# Patient Record
Sex: FEMALE | Race: OTHER | URBAN - METROPOLITAN AREA
[De-identification: names, ages, dates, MRNs, and addresses within clinical notes are randomized per-mention and may not be internally consistent; named-entity substitution may affect disease eponyms.]

---

## 2018-01-03 ENCOUNTER — EMERGENCY (EMERGENCY)
Facility: HOSPITAL | Age: 33
LOS: 1 days | Discharge: HOME | End: 2018-01-03
Attending: EMERGENCY MEDICINE | Admitting: EMERGENCY MEDICINE
Payer: COMMERCIAL

## 2018-01-03 VITALS
HEART RATE: 76 BPM | DIASTOLIC BLOOD PRESSURE: 83 MMHG | OXYGEN SATURATION: 100 % | TEMPERATURE: 98 F | SYSTOLIC BLOOD PRESSURE: 118 MMHG | RESPIRATION RATE: 18 BRPM | HEIGHT: 66 IN | WEIGHT: 270.07 LBS

## 2018-01-03 DIAGNOSIS — N93.9 ABNORMAL UTERINE AND VAGINAL BLEEDING, UNSPECIFIED: ICD-10-CM

## 2018-01-03 PROCEDURE — 99284 EMERGENCY DEPT VISIT MOD MDM: CPT | Mod: 25

## 2018-01-03 PROCEDURE — 85025 COMPLETE CBC W/AUTO DIFF WBC: CPT

## 2018-01-03 PROCEDURE — 85730 THROMBOPLASTIN TIME PARTIAL: CPT

## 2018-01-03 PROCEDURE — 76830 TRANSVAGINAL US NON-OB: CPT | Mod: 26

## 2018-01-03 PROCEDURE — 86901 BLOOD TYPING SEROLOGIC RH(D): CPT

## 2018-01-03 PROCEDURE — 99285 EMERGENCY DEPT VISIT HI MDM: CPT

## 2018-01-03 PROCEDURE — 85610 PROTHROMBIN TIME: CPT

## 2018-01-03 PROCEDURE — 86900 BLOOD TYPING SEROLOGIC ABO: CPT

## 2018-01-03 PROCEDURE — 81001 URINALYSIS AUTO W/SCOPE: CPT

## 2018-01-03 PROCEDURE — 76830 TRANSVAGINAL US NON-OB: CPT

## 2018-01-03 PROCEDURE — 84702 CHORIONIC GONADOTROPIN TEST: CPT

## 2018-01-03 PROCEDURE — 86850 RBC ANTIBODY SCREEN: CPT

## 2018-01-03 PROCEDURE — 36415 COLL VENOUS BLD VENIPUNCTURE: CPT

## 2018-01-03 PROCEDURE — 80053 COMPREHEN METABOLIC PANEL: CPT

## 2018-01-03 NOTE — ED PROVIDER NOTE - OBJECTIVE STATEMENT
32F with no known medical history, however report of irregular periods presenting to the ED complaining of heavy vaginal bleeding. Patient states that she has very irregular periods with cycles ranging from 2 weeks to 2 months. Has not been seen by Ob/Gyn in at least 3 years, has never been on any 32F with no known medical history, however report of irregular periods presenting to the ED complaining of heavy vaginal bleeding. Patient states that she has very irregular periods with cycles ranging from 2 weeks to 2 months. Has not been seen by Ob/Gyn in at least 3 years, has never taken any OCPs 32F with no known medical history, however report of irregular periods presenting to the ED complaining of heavy vaginal bleeding. Patient states that she has very irregular periods with cycles ranging from 2 weeks to 2 months. Has not been seen by Ob/Gyn in at least 3 years, has never taken any OCPs. Never with heavy period in the past, never requiring workup. Bleeding has been persistently heavy over the last 3 days and has noted multiple clots. Has been going through about 2pads (used at the same time) over 2 hours. No history of anemia or fibroids in the past. No HA 32F with no known medical history, however report of irregular periods presenting to the ED complaining of heavy vaginal bleeding. Patient states that she has very irregular periods with cycles ranging from 2 weeks to 2 months. Has not been seen by Ob/Gyn in at least 3 years, has never taken any OCPs. Never with heavy period in the past, never requiring workup. Bleeding has been persistently heavy over the last 3 days and has noted multiple clots. Has been going through about 2pads (used at the same time) over 2 hours. No history of anemia or fibroids in the past. No HA, no dizziness at this time, no SOB, no abdominal pain, reports some back cramps that are similar to her normal cramps.

## 2018-01-03 NOTE — ED ADULT TRIAGE NOTE - ARRIVAL INFO ADDITIONAL COMMENTS
Pt c/o heavy vaginal bleeding with clots x 3days. Pt also c/o worse than her usual menstrual cramps. Pt also c/o dizziness and generalized weakness today.

## 2018-01-03 NOTE — ED PROVIDER NOTE - MEDICAL DECISION MAKING DETAILS
patient with ++vaginal bleeding compared to usual. CBC without need for urgent transfusion. US transvaginal performed patient with ++vaginal bleeding compared to usual. CBC without need for urgent transfusion. US transvaginal performed no acute findings will dc to home with info on gyn follow up

## 2018-01-03 NOTE — ED PROVIDER NOTE - ATTENDING CONTRIBUTION TO CARE
Patient in ED with cc increased amount of vaginal bleeding and pelvic discomfort over the past several days.  Patient notes irregularity to her menstrual cycle, however states she is bleeding more than what it typical for her.  She denies associated fever, chills, urinary symptoms, changes to bowel movements, nausea, vomiting or any additional acute complaints/concerns at this time.  Upon my face to face ED evaluation of patient she is non toxic in appearance, heart with regular and rhythm, lungs are clear to auscultation, bilaterally.  Abd is soft without significant reproducibility of pain on exam.  US imaging completed and unremarkable.  Hgb low end of normal.  Results discussed with patient.  Gyn in ED to evaluate.  Will disposition per recommendations, however anticipate discharge with outpatient follow up.  Patient aware of plan and is currently resting comfortably. Patient in ED with cc increased amount of vaginal bleeding and pelvic discomfort over the past several days.  Patient notes irregularity to her menstrual cycle, however states she is bleeding more than what it typical for her.  She denies associated fever, chills, urinary symptoms, changes to bowel movements, nausea, vomiting or any additional acute complaints/concerns at this time.  Upon my face to face ED evaluation of patient she is non toxic in appearance, heart with regular and rhythm, lungs are clear to auscultation, bilaterally.  Abd is soft without significant reproducibility of pain on exam.  US imaging completed and unremarkable.  Hgb low end of normal.  Results discussed with patient.  Patient to be discharged at this time as she is non toxic, vitals stable and US unremarkable.  She is provided gyn follow up info and also advised to follow up with her PCP in 1-2 days for re eval.  She is told to return immediately to ED should symptoms worsen or if she has any concern prior to this recommended follow up.  Patient aware of plan and verbalizes her understanding.  She will be discharged home at this time.

## 2018-01-04 VITALS
TEMPERATURE: 98 F | SYSTOLIC BLOOD PRESSURE: 121 MMHG | DIASTOLIC BLOOD PRESSURE: 78 MMHG | RESPIRATION RATE: 18 BRPM | OXYGEN SATURATION: 100 % | HEART RATE: 77 BPM

## 2018-12-17 ENCOUNTER — EMERGENCY (EMERGENCY)
Facility: HOSPITAL | Age: 33
LOS: 1 days | Discharge: HOME | End: 2018-12-17
Attending: EMERGENCY MEDICINE
Payer: COMMERCIAL

## 2018-12-17 VITALS
OXYGEN SATURATION: 98 % | DIASTOLIC BLOOD PRESSURE: 84 MMHG | HEART RATE: 86 BPM | WEIGHT: 274.03 LBS | SYSTOLIC BLOOD PRESSURE: 130 MMHG | HEIGHT: 65 IN | RESPIRATION RATE: 16 BRPM | TEMPERATURE: 97 F

## 2018-12-17 PROCEDURE — 96375 TX/PRO/DX INJ NEW DRUG ADDON: CPT

## 2018-12-17 PROCEDURE — 96374 THER/PROPH/DIAG INJ IV PUSH: CPT

## 2018-12-17 PROCEDURE — 99284 EMERGENCY DEPT VISIT MOD MDM: CPT | Mod: 25

## 2018-12-17 RX ORDER — DIPHENHYDRAMINE HCL 50 MG
25 CAPSULE ORAL ONCE
Qty: 0 | Refills: 0 | Status: COMPLETED | OUTPATIENT
Start: 2018-12-17 | End: 2018-12-17

## 2018-12-17 RX ORDER — PSEUDOEPHEDRINE HCL 30 MG
1 TABLET ORAL
Qty: 15 | Refills: 0 | OUTPATIENT
Start: 2018-12-17 | End: 2018-12-21

## 2018-12-17 RX ORDER — KETOROLAC TROMETHAMINE 30 MG/ML
30 SYRINGE (ML) INJECTION ONCE
Qty: 0 | Refills: 0 | Status: DISCONTINUED | OUTPATIENT
Start: 2018-12-17 | End: 2018-12-17

## 2018-12-17 RX ORDER — METOCLOPRAMIDE HCL 10 MG
10 TABLET ORAL ONCE
Qty: 0 | Refills: 0 | Status: COMPLETED | OUTPATIENT
Start: 2018-12-17 | End: 2018-12-17

## 2018-12-17 RX ORDER — SODIUM CHLORIDE 9 MG/ML
1000 INJECTION INTRAMUSCULAR; INTRAVENOUS; SUBCUTANEOUS ONCE
Qty: 0 | Refills: 0 | Status: COMPLETED | OUTPATIENT
Start: 2018-12-17 | End: 2018-12-17

## 2018-12-17 RX ORDER — IBUPROFEN 200 MG
1 TABLET ORAL
Qty: 30 | Refills: 0 | OUTPATIENT
Start: 2018-12-17 | End: 2018-12-26

## 2018-12-17 RX ADMIN — Medication 10 MILLIGRAM(S): at 02:53

## 2018-12-17 RX ADMIN — Medication 25 MILLIGRAM(S): at 02:53

## 2018-12-17 RX ADMIN — Medication 30 MILLIGRAM(S): at 02:53

## 2018-12-17 RX ADMIN — SODIUM CHLORIDE 1000 MILLILITER(S): 9 INJECTION INTRAMUSCULAR; INTRAVENOUS; SUBCUTANEOUS at 02:53

## 2018-12-17 NOTE — ED PROVIDER NOTE - NEUROLOGICAL, MLM
Alert and oriented, no focal deficits, no motor or sensory deficits. Neck supple, full range of motion.

## 2018-12-17 NOTE — ED ADULT NURSE NOTE - NSIMPLEMENTINTERV_GEN_ALL_ED
Implemented All Universal Safety Interventions:  [place] to call system. Call bell, personal items and telephone within reach. Instruct patient to call for assistance. Room bathroom lighting operational. Non-slip footwear when patient is off stretcher. Physically safe environment: no spills, clutter or unnecessary equipment. Stretcher in lowest position, wheels locked, appropriate side rails in place.

## 2018-12-17 NOTE — ED PROVIDER NOTE - MEDICAL DECISION MAKING DETAILS
34 y/o F here w/ likely viral URI, w/ sinus congestion/sinusitis. Low suspicion for bacterial sinusitis. Will treat symptomatically and re-evaluate.

## 2018-12-17 NOTE — ED PROVIDER NOTE - OBJECTIVE STATEMENT
32 y/o F with no significant PMHx presents to ED c/o headache and body aches x 2-3 days ago w/ URI like symptoms of congestion and dry cough. Last night pt developed a severe headache over L frontal sinus. Pt denies any shortness of breath, chest pain, back pain, neck pain/stiffness, photophobia, any neurological symptoms or any other complaints.

## 2018-12-17 NOTE — ED PROVIDER NOTE - NSFOLLOWUPINSTRUCTIONS_ED_ALL_ED_FT
Take meds as prescribed.  Drink plenty of fluids.   Follow up with your doctor or in the Clinic as discussed within 2 days.  Return to the ER for any concerns.

## 2018-12-17 NOTE — ED ADULT NURSE NOTE - OBJECTIVE STATEMENT
pt is A&Ox3, ambulatory, able to make needs known and presents with C/O headache, sore throat, chest pain, cough and sinus pressure that is worsening in nature x 4 days

## 2018-12-17 NOTE — ED PROVIDER NOTE - NSFOLLOWUPCLINICS_GEN_ALL_ED_FT
[facility]  [facility]  [address] - [address] [place] [address]  Phone: [phone]  Fax: [phone]  Follow Up Time:

## 2019-08-27 ENCOUNTER — APPOINTMENT (OUTPATIENT)
Dept: RHEUMATOLOGY | Facility: CLINIC | Age: 34
End: 2019-08-27
Payer: COMMERCIAL

## 2019-08-27 VITALS
BODY MASS INDEX: 45.15 KG/M2 | OXYGEN SATURATION: 100 % | HEART RATE: 82 BPM | SYSTOLIC BLOOD PRESSURE: 105 MMHG | WEIGHT: 271 LBS | HEIGHT: 65 IN | DIASTOLIC BLOOD PRESSURE: 73 MMHG | TEMPERATURE: 97.9 F

## 2019-08-27 PROBLEM — Z00.00 ENCOUNTER FOR PREVENTIVE HEALTH EXAMINATION: Status: ACTIVE | Noted: 2019-08-27

## 2019-08-27 PROCEDURE — 99205 OFFICE O/P NEW HI 60 MIN: CPT | Mod: GC,25

## 2019-08-27 PROCEDURE — 36415 COLL VENOUS BLD VENIPUNCTURE: CPT

## 2019-08-28 NOTE — PHYSICAL EXAM
[General Appearance - Alert] : alert [General Appearance - In No Acute Distress] : in no acute distress [Extraocular Movements] : extraocular movements were intact [Sclera] : the sclera and conjunctiva were normal [PERRL With Normal Accommodation] : pupils were equal in size, round, and reactive to light [Outer Ear] : the ears and nose were normal in appearance [Oropharynx] : the oropharynx was normal [] : no respiratory distress [Heart Rate And Rhythm] : heart rate was normal and rhythm regular [Auscultation Breath Sounds / Voice Sounds] : lungs were clear to auscultation bilaterally [Heart Sounds] : normal S1 and S2 [Heart Sounds Gallop] : no gallops [Murmurs] : no murmurs [Heart Sounds Pericardial Friction Rub] : no pericardial rub [FreeTextEntry1] : decreased range of motion on R ankle, normal ROM on L ankle, no erythema or warmth, no edema

## 2019-08-28 NOTE — HISTORY OF PRESENT ILLNESS
[Depression] : depression [Fatigue] : fatigue [Arthralgias] : arthralgias [Joint Swelling] : joint swelling [Decreased ROM] : decreased range of motion [Difficulty Standing] : difficulty standing [Difficulty Walking] : difficulty walking [Muscle Weakness] : muscle weakness [Muscle Spasms] : muscle spasms [FreeTextEntry1] : 33 y.o F who presents as referral from Dr. Flor for R ankle pain and positive SRINIVAS. \par \par Pt reports that 4 months ago, she started having a lot of pain in her R ankle after standing on it 2 hours. No other joints are involved. R foot toes get stiff and cannot move. Endorses swelling when it flares. Denies morning stiffness. Only starts to feel pain after standing/walking on it for 2 hours; only sometimes relieves with sitting down/rest. Pain is constant when it starts, pain alleviates with heat pack. Causing her anxiety, hinders her to go out and run errands for a long time. \par \par Presented to urgent care 3 months ago who said xray was normal and referred her to podiatry. Ibuprofen 600mg didn't resolve the pain. Received cortisone injection twice by podiatrist 1.5 months ago most recently, without any relief. Podiatrist prescribed Meloxicam, which made her dizzy so she stopped taking. Pt was originally referred to hematologist because "bone discoloration" was seen on R ankle MRI. She thinks that hematologist may have suggested lupus and arthritis. Hematologist prescribed Naproxen, doesn't notice significant difference. Tried oral steroids without any relief, made her sick.\par \par Started physical therapy yesterday, but says that it was limited because no known diagnosis. \par \par No RP, no hx of miscarriages, no hx of proteinuria/hematuria, no hx of pleuropericarditis, no hx of arterial/venous clot, no alopecia.\par  [Anorexia] : no anorexia [Weight Loss] : no weight loss [Malaise] : no malaise [Fever] : no fever [Chills] : no chills [Malar Facial Rash] : no malar facial rash [Skin Lesions] : no lesions [Skin Nodules] : no skin nodules [Oral Ulcers] : no oral ulcers [Cough] : no cough [Dry Mouth] : no dry mouth [Dysphonia] : no dysphonia [Dysphagia] : no dysphagia [Shortness of Breath] : no shortness of breath [Chest Pain] : no chest pain [Joint Warmth] : no joint warmth [Joint Deformity] : no joint deformity [Morning Stiffness] : no morning stiffness [Falls] : no falls [Dyspnea] : no dyspnea [Myalgias] : no myalgias [Muscle Cramping] : no muscle cramping [Visual Changes] : no visual changes [Eye Pain] : no eye pain [Eye Redness] : no eye redness [Dry Eyes] : no dry eyes

## 2019-08-28 NOTE — ADDENDUM
[FreeTextEntry1] : Patient seen and examined with Dr. Stewart \par Agree with history, physical exam, assessment and plan as described above\par 33 year old female referred for positive SRINIVAS (unclear titre and pattern) drawn in setting of R ankle pain. She does not provide records of serologies or MRI of her ankle. She notes persistent pain for many months, without response to oral or intra-articular steroids - This makes an inflammatory etiology less likely, and the etiology remains unclear. Would like to elucidate what these "dark spots" are that she is referring to. Could be concerning for brown tumor vs osteosarcoma/malignancy, though she endorses no other manifestations of hyperparathyroidism or systemic / constitutional symptoms. \par Check serologies and await records. \par

## 2019-08-28 NOTE — ASSESSMENT
[FreeTextEntry1] : 33 y.o F who presents as referral for positive SRINIVAS and R ankle pain. Unclear etiology of ankle pain without imaging results; patient to send the office report of MRI. Has not achieved any relief with anti-inflammatory treatments such as cortisone injections or oral steroids, so difficult to say that this can be attributed to inflammatory process. However, will evaluate further serologies and bloodwork for positive SRINIVAS. Without MRI report, unclear what "discoloration of bone" is, but consider brown tumor--will check PTH. Differential includes overuse tendinopathy/tendinitis given history of getting worse with use and relieves with rest. \par - bloodwork and serologies for positive SRINIVAS: dsDNA, smith, centromere, cardiolipin, beta2 glycoprotein, scleroderma Abs, anti-thyroid, immunoglobulins, urinalysis, P/C ratio, c3/c4\par - patient to send over MRI results of R ankle to further understand process \par - check PTH for evaluation of discoloration of bone, per pt--consider brown tumor.

## 2019-09-11 LAB
APPEARANCE: ABNORMAL
APTT 2H P 1:4 NP PPP: 58.2 SEC
APTT 2H P INC PPP: 54.7 SEC
APTT 50/50 MIX COMMENT: NORMAL
APTT IMM NP/PRE NP PPP: 55.7 SEC
APTT INV RATIO PPP: 66.3 SEC
B2 GLYCOPROT1 IGA SERPL IA-ACNC: 19.3 SAU
B2 GLYCOPROT1 IGG SER-ACNC: 126.3 SGU
B2 GLYCOPROT1 IGM SER-ACNC: 28.6 SMU
BACTERIA: ABNORMAL
BILIRUBIN URINE: NEGATIVE
BLOOD URINE: NEGATIVE
C3 SERPL-MCNC: 111 MG/DL
C4 SERPL-MCNC: 17 MG/DL
CALCIUM SERPL-MCNC: 8.9 MG/DL
CARDIOLIPIN IGM SER-MCNC: 35.7 MPL
CARDIOLIPIN IGM SER-MCNC: 81 GPL
CENTROMERE IGG SER-ACNC: <0.2 CD:130001892
COLOR: YELLOW
CONFIRM: 27.3 SEC
CREAT SPEC-SCNC: 130 MG/DL
CREAT/PROT UR: 0.1 RATIO
DEPRECATED CARDIOLIPIN IGA SER: 12.2 APL
DRVVT IMM 1:2 NP PPP: ABNORMAL
DRVVT SCREEN TO CONFIRM RATIO: 2.11 RATIO
DSDNA AB SER-ACNC: 135 IU/ML
ENA RNP AB SER IA-ACNC: 0.6 AL
ENA SCL70 IGG SER IA-ACNC: <0.2 AL
ENA SM AB SER IA-ACNC: 0.2 AL
ENA SS-A AB SER IA-ACNC: >8 AL
ENA SS-B AB SER IA-ACNC: >8 AL
FERRITIN SERPL-MCNC: 66 NG/ML
GLUCOSE QUALITATIVE U: NEGATIVE
HYALINE CASTS: 1 /LPF
IGA SER QL IEP: 273 MG/DL
IGG SER QL IEP: 2269 MG/DL
IGM SER QL IEP: 258 MG/DL
KETONES URINE: NEGATIVE
LEUKOCYTE ESTERASE URINE: NEGATIVE
MAGNESIUM SERPL-MCNC: 1.9 MG/DL
MICROSCOPIC-UA: NORMAL
NITRITE URINE: POSITIVE
NPP NORMAL POOLED PLASMA: 34 SECS
PARATHYROID HORMONE INTACT: 23 PG/ML
PH URINE: 6.5
PROT UR-MCNC: 12 MG/DL
PROTEIN URINE: NORMAL
PS IGA SER QL: <20 U/ML
PS IGG SER QL: 75 U/ML
PS IGM SER QL: >100 U/ML
RED BLOOD CELLS URINE: 2 /HPF
RHEUMATOID FACT SER QL: 34 IU/ML
SCREEN DRVVT: 69.4 SEC
SILICA CLOTTING TIME INTERPRETATION: ABNORMAL
SILICA CLOTTING TIME S/C: 3.39 RATIO
SPECIFIC GRAVITY URINE: 1.02
SQUAMOUS EPITHELIAL CELLS: 4 /HPF
THYROGLOB AB SERPL-ACNC: <20 IU/ML
THYROPEROXIDASE AB SERPL IA-ACNC: 11.8 IU/ML
TRANSFERRIN SERPL-MCNC: 253 MG/DL
TSH SERPL-ACNC: 1.42 UIU/ML
UROBILINOGEN URINE: NORMAL
WHITE BLOOD CELLS URINE: 7 /HPF

## 2019-09-30 ENCOUNTER — APPOINTMENT (OUTPATIENT)
Dept: RHEUMATOLOGY | Facility: CLINIC | Age: 34
End: 2019-09-30
Payer: COMMERCIAL

## 2019-09-30 ENCOUNTER — LABORATORY RESULT (OUTPATIENT)
Age: 34
End: 2019-09-30

## 2019-09-30 VITALS
BODY MASS INDEX: 45.32 KG/M2 | HEART RATE: 86 BPM | DIASTOLIC BLOOD PRESSURE: 82 MMHG | TEMPERATURE: 98.7 F | SYSTOLIC BLOOD PRESSURE: 116 MMHG | HEIGHT: 65 IN | OXYGEN SATURATION: 99 % | WEIGHT: 272 LBS

## 2019-09-30 PROCEDURE — 36415 COLL VENOUS BLD VENIPUNCTURE: CPT

## 2019-09-30 PROCEDURE — 99214 OFFICE O/P EST MOD 30 MIN: CPT | Mod: 25

## 2019-09-30 RX ORDER — FOLIC ACID 1 MG/1
1 TABLET ORAL
Qty: 30 | Refills: 6 | Status: ACTIVE | COMMUNITY
Start: 2019-09-30 | End: 1900-01-01

## 2019-10-02 NOTE — HISTORY OF PRESENT ILLNESS
[Fatigue] : fatigue [Depression] : depression [Arthralgias] : arthralgias [Joint Swelling] : joint swelling [Decreased ROM] : decreased range of motion [Difficulty Standing] : difficulty standing [Difficulty Walking] : difficulty walking [Muscle Spasms] : muscle spasms [Muscle Weakness] : muscle weakness [FreeTextEntry1] : Initial Visit: \par 33 year old female referred for positive SRINIVAS (unclear titre and pattern) drawn in setting of R ankle pain. She does not provide records of serologies or MRI of her ankle. She notes persistent pain for many months, without response to oral or intra-articular steroids - This makes an inflammatory etiology less likely, and the etiology remains unclear. Would like to elucidate what these "dark spots" are that she is referring to. Could be concerning for brown tumor vs osteosarcoma/malignancy, though she endorses no other manifestations of hyperparathyroidism or systemic / constitutional symptoms. \par Check serologies and await records. \par  [Anorexia] : no anorexia [Weight Loss] : no weight loss [Malaise] : no malaise [Fever] : no fever [Chills] : no chills [Malar Facial Rash] : no malar facial rash [Skin Lesions] : no lesions [Skin Nodules] : no skin nodules [Oral Ulcers] : no oral ulcers [Cough] : no cough [Dry Mouth] : no dry mouth [Dysphonia] : no dysphonia [Dysphagia] : no dysphagia [Shortness of Breath] : no shortness of breath [Chest Pain] : no chest pain [Joint Warmth] : no joint warmth [Joint Deformity] : no joint deformity [Morning Stiffness] : no morning stiffness [Dyspnea] : no dyspnea [Falls] : no falls [Myalgias] : no myalgias [Muscle Cramping] : no muscle cramping [Visual Changes] : no visual changes [Eye Pain] : no eye pain [Eye Redness] : no eye redness [Dry Eyes] : no dry eyes

## 2019-10-02 NOTE — ASSESSMENT
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 D/w patient that she can't get pregnant on MTX, not trying to get pregnant at this time and not sexually active currently. \par Re check APS serologies, if persistently positive will need to start Aspirin. \par

## 2019-10-02 NOTE — PHYSICAL EXAM
[General Appearance - Alert] : alert [General Appearance - In No Acute Distress] : in no acute distress [PERRL With Normal Accommodation] : pupils were equal in size, round, and reactive to light [Sclera] : the sclera and conjunctiva were normal [Extraocular Movements] : extraocular movements were intact [Outer Ear] : the ears and nose were normal in appearance [Oropharynx] : the oropharynx was normal [] : no respiratory distress [Auscultation Breath Sounds / Voice Sounds] : lungs were clear to auscultation bilaterally [Heart Rate And Rhythm] : heart rate was normal and rhythm regular [Heart Sounds Gallop] : no gallops [Heart Sounds] : normal S1 and S2 [Heart Sounds Pericardial Friction Rub] : no pericardial rub [Murmurs] : no murmurs [FreeTextEntry1] : decreased range of motion on R ankle, normal ROM on L ankle, no erythema or warmth, no edema

## 2019-10-04 LAB
ALBUMIN SERPL ELPH-MCNC: 3.9 G/DL
ALP BLD-CCNC: 74 U/L
ALT SERPL-CCNC: 62 U/L
ANION GAP SERPL CALC-SCNC: 10 MMOL/L
APTT 2H P 1:4 NP PPP: 56.7 SEC
APTT 2H P INC PPP: 59.4 SEC
APTT 50/50 MIX COMMENT: NORMAL
APTT IMM NP/PRE NP PPP: 58.8 SEC
APTT INV RATIO PPP: 61.9 SEC
AST SERPL-CCNC: 45 U/L
B2 GLYCOPROT1 IGA SERPL IA-ACNC: 13.2 SAU
B2 GLYCOPROT1 IGG SER-ACNC: 126.1 SGU
B2 GLYCOPROT1 IGM SER-ACNC: 34 SMU
BASOPHILS # BLD AUTO: 0.03 K/UL
BASOPHILS NFR BLD AUTO: 0.4 %
BILIRUB SERPL-MCNC: 0.4 MG/DL
BUN SERPL-MCNC: 14 MG/DL
C3 SERPL-MCNC: 96 MG/DL
C4 SERPL-MCNC: 13 MG/DL
CALCIUM SERPL-MCNC: 8.7 MG/DL
CARDIOLIPIN IGM SER-MCNC: 50 MPL
CARDIOLIPIN IGM SER-MCNC: 81.7 GPL
CHLORIDE SERPL-SCNC: 104 MMOL/L
CO2 SERPL-SCNC: 24 MMOL/L
CONFIRM: 27.3 SEC
CREAT SERPL-MCNC: 0.54 MG/DL
DEPRECATED CARDIOLIPIN IGA SER: 6.7 APL
DRVVT IMM 1:2 NP PPP: ABNORMAL
DRVVT SCREEN TO CONFIRM RATIO: 2.02 RATIO
EOSINOPHIL # BLD AUTO: 0.08 K/UL
EOSINOPHIL NFR BLD AUTO: 1.1 %
GLUCOSE SERPL-MCNC: 87 MG/DL
HCT VFR BLD CALC: 37.9 %
HGB BLD-MCNC: 11.9 G/DL
IMM GRANULOCYTES NFR BLD AUTO: 0.1 %
LYMPHOCYTES # BLD AUTO: 1.62 K/UL
LYMPHOCYTES NFR BLD AUTO: 22.8 %
MAN DIFF?: NORMAL
MCHC RBC-ENTMCNC: 27.1 PG
MCHC RBC-ENTMCNC: 31.4 GM/DL
MCV RBC AUTO: 86.3 FL
MONOCYTES # BLD AUTO: 0.31 K/UL
MONOCYTES NFR BLD AUTO: 4.4 %
NEUTROPHILS # BLD AUTO: 5.05 K/UL
NEUTROPHILS NFR BLD AUTO: 71.2 %
NPP NORMAL POOLED PLASMA: 32.3 SECS
PLATELET # BLD AUTO: 45 K/UL
POTASSIUM SERPL-SCNC: 3.9 MMOL/L
PROT SERPL-MCNC: 7.9 G/DL
RBC # BLD: 4.39 M/UL
RBC # FLD: 14.3 %
SCREEN DRVVT: 69.1 SEC
SILICA CLOTTING TIME INTERPRETATION: ABNORMAL
SILICA CLOTTING TIME S/C: 3.6 RATIO
SODIUM SERPL-SCNC: 138 MMOL/L
WBC # FLD AUTO: 7.1 K/UL

## 2019-10-04 RX ORDER — ASPIRIN ENTERIC COATED TABLETS 81 MG 81 MG/1
81 TABLET, DELAYED RELEASE ORAL DAILY
Qty: 30 | Refills: 3 | Status: ACTIVE | COMMUNITY
Start: 2019-10-04 | End: 1900-01-01

## 2019-10-22 ENCOUNTER — RX CHANGE (OUTPATIENT)
Age: 34
End: 2019-10-22

## 2019-10-22 RX ORDER — METHOTREXATE 2.5 MG/1
2.5 TABLET ORAL
Qty: 12 | Refills: 4 | Status: DISCONTINUED | COMMUNITY
Start: 2019-09-30 | End: 2019-10-22

## 2019-10-22 RX ORDER — METHOTREXATE 2.5 MG/1
2.5 TABLET ORAL
Qty: 36 | Refills: 2 | Status: ACTIVE | COMMUNITY
Start: 2019-10-22 | End: 1900-01-01

## 2019-12-02 ENCOUNTER — APPOINTMENT (OUTPATIENT)
Dept: RHEUMATOLOGY | Facility: CLINIC | Age: 34
End: 2019-12-02
Payer: COMMERCIAL

## 2019-12-02 VITALS
WEIGHT: 272 LBS | DIASTOLIC BLOOD PRESSURE: 78 MMHG | BODY MASS INDEX: 45.32 KG/M2 | SYSTOLIC BLOOD PRESSURE: 107 MMHG | HEIGHT: 65 IN | OXYGEN SATURATION: 99 % | HEART RATE: 76 BPM | TEMPERATURE: 98.6 F

## 2019-12-02 DIAGNOSIS — R76.0 RAISED ANTIBODY TITER: ICD-10-CM

## 2019-12-02 DIAGNOSIS — M19.90 UNSPECIFIED OSTEOARTHRITIS, UNSPECIFIED SITE: ICD-10-CM

## 2019-12-02 DIAGNOSIS — R76.8 OTHER SPECIFIED ABNORMAL IMMUNOLOGICAL FINDINGS IN SERUM: ICD-10-CM

## 2019-12-02 PROCEDURE — 36415 COLL VENOUS BLD VENIPUNCTURE: CPT

## 2019-12-02 PROCEDURE — 99214 OFFICE O/P EST MOD 30 MIN: CPT | Mod: 25

## 2019-12-03 NOTE — HISTORY OF PRESENT ILLNESS
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 D/w patient that she can't get pregnant on MTX, not trying to get pregnant at this time and not sexually active currently. \par Re check APS serologies, if persistently positive will need to start Aspirin. \par \par Initial Visit: \par 33 year old female referred for positive SRINIVAS (unclear titre and pattern) drawn in setting of R ankle pain. She does not provide records of serologies or MRI of her ankle. She notes persistent pain for many months, without response to oral or intra-articular steroids - This makes an inflammatory etiology less likely, and the etiology remains unclear. Would like to elucidate what these "dark spots" are that she is referring to. Could be concerning for brown tumor vs osteosarcoma/malignancy, though she endorses no other manifestations of hyperparathyroidism or systemic / constitutional symptoms. \par Check serologies and await records. \par  [Anorexia] : no anorexia [Weight Loss] : no weight loss [Malaise] : no malaise [Fever] : no fever [Chills] : no chills [Fatigue] : fatigue [Depression] : no depression [Malar Facial Rash] : no malar facial rash [Skin Lesions] : no lesions [Skin Nodules] : no skin nodules [Oral Ulcers] : no oral ulcers [Cough] : no cough [Dry Mouth] : no dry mouth [Dysphonia] : no dysphonia [Shortness of Breath] : no shortness of breath [Dysphagia] : no dysphagia [Chest Pain] : no chest pain [Joint Swelling] : no joint swelling [Joint Warmth] : no joint warmth [Morning Stiffness] : no morning stiffness [Decreased ROM] : no decreased range of motion [Falls] : no falls [Joint Deformity] : no joint deformity [Difficulty Walking] : no difficulty walking [Difficulty Standing] : no difficulty standing [Muscle Weakness] : no muscle weakness [Dyspnea] : no dyspnea [Myalgias] : myalgias [Muscle Spasms] : no muscle spasms [Eye Pain] : no eye pain [Muscle Cramping] : no muscle cramping [Visual Changes] : no visual changes [Dry Eyes] : no dry eyes [Eye Redness] : no eye redness

## 2019-12-03 NOTE — ASSESSMENT
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 \par Doing well overall - though she still notes some pain in her ankle, her exam is much improved. \par Have recommended diet/exercise adjustment for now and continuation of current therapy. Based on CBC/CMP can also consider an increase in the dose of MTX. \par \par

## 2019-12-03 NOTE — PHYSICAL EXAM
[General Appearance - Alert] : alert [General Appearance - In No Acute Distress] : in no acute distress [Sclera] : the sclera and conjunctiva were normal [PERRL With Normal Accommodation] : pupils were equal in size, round, and reactive to light [Extraocular Movements] : extraocular movements were intact [Outer Ear] : the ears and nose were normal in appearance [Oropharynx] : the oropharynx was normal [Auscultation Breath Sounds / Voice Sounds] : lungs were clear to auscultation bilaterally [Heart Rate And Rhythm] : heart rate was normal and rhythm regular [Heart Sounds] : normal S1 and S2 [Heart Sounds Gallop] : no gallops [Murmurs] : no murmurs [Heart Sounds Pericardial Friction Rub] : no pericardial rub [Nasal Cavity] : the nasal mucosa and septum were normal [Neck Appearance] : the appearance of the neck was normal [Edema] : there was no peripheral edema [Bowel Sounds] : normal bowel sounds [Respiration, Rhythm And Depth] : normal respiratory rhythm and effort [Cervical Lymph Nodes Enlarged Posterior Bilaterally] : posterior cervical [Abdomen Tenderness] : non-tender [Abdomen Soft] : soft [Axillary Lymph Nodes Enlarged Bilaterally] : axillary [Cervical Lymph Nodes Enlarged Anterior Bilaterally] : anterior cervical [Supraclavicular Lymph Nodes Enlarged Bilaterally] : supraclavicular [No Spinal Tenderness] : no spinal tenderness [FreeTextEntry1] : R ankle without swelling, warmth, tenderness and good ROM. no trigger point tenderness.  [] : no rash [Oriented To Time, Place, And Person] : oriented to person, place, and time [Motor Exam] : the motor exam was normal [Sensation] : the sensory exam was normal to light touch and pinprick

## 2019-12-06 LAB
ALBUMIN SERPL ELPH-MCNC: 4 G/DL
ALP BLD-CCNC: 70 U/L
ALT SERPL-CCNC: 15 U/L
ANION GAP SERPL CALC-SCNC: 11 MMOL/L
AST SERPL-CCNC: 18 U/L
BASOPHILS # BLD AUTO: 0.03 K/UL
BASOPHILS NFR BLD AUTO: 0.5 %
BILIRUB SERPL-MCNC: 0.4 MG/DL
BUN SERPL-MCNC: 15 MG/DL
C3 SERPL-MCNC: 116 MG/DL
C4 SERPL-MCNC: 17 MG/DL
CALCIUM SERPL-MCNC: 8.8 MG/DL
CHLORIDE SERPL-SCNC: 103 MMOL/L
CO2 SERPL-SCNC: 25 MMOL/L
CREAT SERPL-MCNC: 0.55 MG/DL
CRP SERPL-MCNC: 0.81 MG/DL
DSDNA AB SER-ACNC: 68 IU/ML
EOSINOPHIL # BLD AUTO: 0.13 K/UL
EOSINOPHIL NFR BLD AUTO: 2.1 %
ERYTHROCYTE [SEDIMENTATION RATE] IN BLOOD BY WESTERGREN METHOD: 61 MM/HR
GLUCOSE SERPL-MCNC: 83 MG/DL
HCT VFR BLD CALC: 37.7 %
HGB BLD-MCNC: 11.6 G/DL
IMM GRANULOCYTES NFR BLD AUTO: 0.2 %
LYMPHOCYTES # BLD AUTO: 1.62 K/UL
LYMPHOCYTES NFR BLD AUTO: 26.3 %
MAN DIFF?: NORMAL
MCHC RBC-ENTMCNC: 26.7 PG
MCHC RBC-ENTMCNC: 30.8 GM/DL
MCV RBC AUTO: 86.9 FL
MONOCYTES # BLD AUTO: 0.28 K/UL
MONOCYTES NFR BLD AUTO: 4.5 %
NEUTROPHILS # BLD AUTO: 4.09 K/UL
NEUTROPHILS NFR BLD AUTO: 66.4 %
PLATELET # BLD AUTO: 108 K/UL
POTASSIUM SERPL-SCNC: 4.1 MMOL/L
PROT SERPL-MCNC: 8 G/DL
RBC # BLD: 4.34 M/UL
RBC # FLD: 15 %
SODIUM SERPL-SCNC: 139 MMOL/L
WBC # FLD AUTO: 6.16 K/UL

## 2020-01-21 ENCOUNTER — APPOINTMENT (OUTPATIENT)
Dept: RHEUMATOLOGY | Facility: CLINIC | Age: 35
End: 2020-01-21

## 2020-01-28 ENCOUNTER — RX RENEWAL (OUTPATIENT)
Age: 35
End: 2020-01-28

## 2020-01-28 RX ORDER — ASPIRIN 81 MG/1
81 TABLET, COATED ORAL
Qty: 30 | Refills: 3 | Status: ACTIVE | COMMUNITY
Start: 2020-01-28 | End: 1900-01-01

## 2020-01-28 RX ORDER — HYDROXYCHLOROQUINE SULFATE 200 MG/1
200 TABLET, FILM COATED ORAL TWICE DAILY
Qty: 60 | Refills: 3 | Status: ACTIVE | COMMUNITY
Start: 2019-09-30 | End: 1900-01-01

## 2020-03-24 ENCOUNTER — APPOINTMENT (OUTPATIENT)
Dept: NEUROLOGY | Facility: CLINIC | Age: 35
End: 2020-03-24

## 2021-05-07 ENCOUNTER — APPOINTMENT (OUTPATIENT)
Dept: RHEUMATOLOGY | Facility: CLINIC | Age: 36
End: 2021-05-07